# Patient Record
Sex: FEMALE | Race: WHITE | Employment: OTHER | URBAN - METROPOLITAN AREA
[De-identification: names, ages, dates, MRNs, and addresses within clinical notes are randomized per-mention and may not be internally consistent; named-entity substitution may affect disease eponyms.]

---

## 2017-05-30 RX ORDER — LEVOTHYROXINE SODIUM 88 UG/1
TABLET ORAL
Qty: 30 TABLET | Refills: 0 | Status: SHIPPED | OUTPATIENT
Start: 2017-05-30 | End: 2017-06-08

## 2017-05-30 NOTE — TELEPHONE ENCOUNTER
Future Appointments  Date Time Provider Zeke Mallory   6/2/2017 9:30 AM Tiffany Cr,  [facility] [address] [facility] [address]

## 2017-05-30 NOTE — TELEPHONE ENCOUNTER
Please call pt and schedule an appt for medication refills. Pt hasnt been seen since 2015. Does she have new PCP? Levothyroxine placed in pending until appt is made and PCP clarified. No future appointments.

## 2017-06-08 ENCOUNTER — OFFICE VISIT (OUTPATIENT)
Dept: FAMILY MEDICINE CLINIC | Facility: CLINIC | Age: 67
End: 2017-06-08

## 2017-06-08 VITALS
BODY MASS INDEX: 30.18 KG/M2 | HEART RATE: 82 BPM | TEMPERATURE: 98 F | OXYGEN SATURATION: 99 % | WEIGHT: 164 LBS | RESPIRATION RATE: 16 BRPM | SYSTOLIC BLOOD PRESSURE: 118 MMHG | HEIGHT: 62 IN | DIASTOLIC BLOOD PRESSURE: 76 MMHG

## 2017-06-08 DIAGNOSIS — Z76.0 MEDICATION REFILL: ICD-10-CM

## 2017-06-08 DIAGNOSIS — N60.12 FIBROCYSTIC BREAST CHANGES OF BOTH BREASTS: ICD-10-CM

## 2017-06-08 DIAGNOSIS — Z13.31 DEPRESSION SCREEN: ICD-10-CM

## 2017-06-08 DIAGNOSIS — Z12.31 VISIT FOR SCREENING MAMMOGRAM: ICD-10-CM

## 2017-06-08 DIAGNOSIS — Z00.00 LABORATORY EXAMINATION ORDERED AS PART OF A ROUTINE GENERAL MEDICAL EXAMINATION: ICD-10-CM

## 2017-06-08 DIAGNOSIS — E03.9 ACQUIRED HYPOTHYROIDISM: Primary | ICD-10-CM

## 2017-06-08 DIAGNOSIS — N60.11 FIBROCYSTIC BREAST CHANGES OF BOTH BREASTS: ICD-10-CM

## 2017-06-08 PROCEDURE — 99213 OFFICE O/P EST LOW 20 MIN: CPT | Performed by: FAMILY MEDICINE

## 2017-06-08 RX ORDER — LEVOTHYROXINE SODIUM 88 UG/1
TABLET ORAL
Qty: 90 TABLET | Refills: 1 | Status: SHIPPED | OUTPATIENT
Start: 2017-06-08 | End: 2017-12-21

## 2017-06-08 NOTE — PROGRESS NOTES
Tom Ruiz is a 78year old female. HPI:   Pt here to re-est care and for hypothyroidism management and not seen since 2015. Courtesy refills given and pt has been doing labs and thyroid levels are controlled on Levothyroxine 88mcg daily.  Turned 6 (36.7 °C) (Temporal)  Resp 16  Ht 62\"  Wt 164 lb  BMI 29.99 kg/m2  SpO2 99%  GENERAL: well developed, well nourished,in no apparent distress  SKIN: no rashes,no suspicious lesions  HEENT: atraumatic, normocephalic,ears and throat are clear  NECK: supple,n

## 2017-06-08 NOTE — PATIENT INSTRUCTIONS
Kishor you were seen for hypothyroidism management. Get your mammogram done. Return for an annual well exam. Get labs done on the day of the annual well exam.   Take care, Dr. Arti Acevedo    Common Thyroid Problems    The thyroid is a gland in the neck. Nodules are lumps of tissue in the thyroid gland. Most often, the cause of nodules isn’t known. But they may be more common in people who’ve had medical radiation to the head or neck. Sometimes nodules can be felt on the outside of the neck.  Most of the ti

## 2017-07-21 LAB
ABSOLUTE BASOPHILS: 28 CELLS/UL (ref 0–200)
ABSOLUTE EOSINOPHILS: 160 CELLS/UL (ref 15–500)
ABSOLUTE LYMPHOCYTES: 1537 CELLS/UL (ref 850–3900)
ABSOLUTE MONOCYTES: 508 CELLS/UL (ref 200–950)
ABSOLUTE NEUTROPHILS: 2468 CELLS/UL (ref 1500–7800)
ALBUMIN/GLOBULIN RATIO: 1.7 (CALC) (ref 1–2.5)
ALBUMIN: 4.1 G/DL (ref 3.6–5.1)
ALKALINE PHOSPHATASE: 66 U/L (ref 33–130)
ALT: 16 U/L (ref 6–29)
AST: 16 U/L (ref 10–35)
BASOPHILS: 0.6 %
BILIRUBIN, TOTAL: 0.4 MG/DL (ref 0.2–1.2)
BUN: 17 MG/DL (ref 7–25)
CALCIUM: 9.4 MG/DL (ref 8.6–10.4)
CARBON DIOXIDE: 27 MMOL/L (ref 20–31)
CHLORIDE: 107 MMOL/L (ref 98–110)
CHOL/HDLC RATIO: 4 (CALC)
CHOLESTEROL, TOTAL: 265 MG/DL (ref 125–200)
CREATININE: 0.8 MG/DL (ref 0.5–0.99)
EGFR IF AFRICN AM: 89 ML/MIN/1.73M2
EGFR IF NONAFRICN AM: 77 ML/MIN/1.73M2
EOSINOPHILS: 3.4 %
GLOBULIN: 2.4 G/DL (CALC) (ref 1.9–3.7)
GLUCOSE: 99 MG/DL (ref 65–99)
HDL CHOLESTEROL: 66 MG/DL
HEMATOCRIT: 38.2 % (ref 35–45)
HEMOGLOBIN: 13.5 G/DL (ref 11.7–15.5)
LDL-CHOLESTEROL: 167 MG/DL (CALC)
LYMPHOCYTES: 32.7 %
MCH: 31.2 PG (ref 27–33)
MCHC: 35.3 G/DL (ref 32–36)
MCV: 88.2 FL (ref 80–100)
MONOCYTES: 10.8 %
MPV: 10 FL (ref 7.5–12.5)
NEUTROPHILS: 52.5 %
NON-HDL CHOLESTEROL: 199 MG/DL (CALC)
PLATELET COUNT: 254 THOUSAND/UL (ref 140–400)
POTASSIUM: 4.4 MMOL/L (ref 3.5–5.3)
PROTEIN, TOTAL: 6.5 G/DL (ref 6.1–8.1)
RDW: 11.8 % (ref 11–15)
RED BLOOD CELL COUNT: 4.33 MILLION/UL (ref 3.8–5.1)
SODIUM: 139 MMOL/L (ref 135–146)
TRIGLYCERIDES: 158 MG/DL
TSH W/REFLEX TO FT4: 0.47 MIU/L (ref 0.4–4.5)
WHITE BLOOD CELL COUNT: 4.7 THOUSAND/UL (ref 3.8–10.8)

## 2017-07-21 NOTE — PROGRESS NOTES
Please call pt with normal results of her cmp, cbc, and thyroid labs. Advise her lipid panel is elevated with total of 265 and LDL of 167 (goal of 99) and HDL of only 66.  She will need to consider statin medication and an OV to discuss medication and treat

## 2017-08-08 ENCOUNTER — TELEPHONE (OUTPATIENT)
Dept: FAMILY MEDICINE CLINIC | Facility: CLINIC | Age: 67
End: 2017-08-08

## 2017-08-08 NOTE — TELEPHONE ENCOUNTER
Patient stated she is returning our call - states probably for test results.   Please call her at [phone]

## 2017-08-12 ENCOUNTER — PATIENT OUTREACH (OUTPATIENT)
Dept: FAMILY MEDICINE CLINIC | Facility: CLINIC | Age: 67
End: 2017-08-12

## 2017-08-12 NOTE — PROGRESS NOTES
Future Appointments  Date Time Provider Zeke Mallory   12/18/2017 1:00 PM Benny Cr,  [facility] [address] [facility] [address]

## 2017-12-05 ENCOUNTER — HOSPITAL ENCOUNTER (OUTPATIENT)
Dept: MAMMOGRAPHY | Age: 67
Discharge: HOME | End: 2017-12-05
Attending: FAMILY MEDICINE
Payer: MEDICARE

## 2017-12-05 DIAGNOSIS — N60.11 FIBROCYSTIC BREAST CHANGES OF BOTH BREASTS: ICD-10-CM

## 2017-12-05 DIAGNOSIS — N60.12 FIBROCYSTIC BREAST CHANGES OF BOTH BREASTS: ICD-10-CM

## 2017-12-05 DIAGNOSIS — Z12.31 VISIT FOR SCREENING MAMMOGRAM: ICD-10-CM

## 2017-12-05 PROCEDURE — 77063 BREAST TOMOSYNTHESIS BI: CPT | Performed by: FAMILY MEDICINE

## 2017-12-05 PROCEDURE — 77067 SCR MAMMO BI INCL CAD: CPT | Performed by: FAMILY MEDICINE

## 2017-12-21 ENCOUNTER — OFFICE VISIT (OUTPATIENT)
Dept: FAMILY MEDICINE CLINIC | Facility: CLINIC | Age: 67
End: 2017-12-21

## 2017-12-21 VITALS
RESPIRATION RATE: 16 BRPM | OXYGEN SATURATION: 99 % | WEIGHT: 168 LBS | BODY MASS INDEX: 30.91 KG/M2 | TEMPERATURE: 98 F | DIASTOLIC BLOOD PRESSURE: 76 MMHG | HEART RATE: 76 BPM | SYSTOLIC BLOOD PRESSURE: 118 MMHG | HEIGHT: 62 IN

## 2017-12-21 DIAGNOSIS — Z13.31 DEPRESSION SCREEN: ICD-10-CM

## 2017-12-21 DIAGNOSIS — Z71.82 EXERCISE COUNSELING: ICD-10-CM

## 2017-12-21 DIAGNOSIS — Z76.0 MEDICATION REFILL: ICD-10-CM

## 2017-12-21 DIAGNOSIS — Z23 NEED FOR PNEUMOCOCCAL VACCINATION: ICD-10-CM

## 2017-12-21 DIAGNOSIS — Z00.00 ENCOUNTER FOR MEDICARE ANNUAL WELLNESS EXAM: Primary | ICD-10-CM

## 2017-12-21 DIAGNOSIS — Z71.3 DIETARY COUNSELING: ICD-10-CM

## 2017-12-21 DIAGNOSIS — E03.9 ACQUIRED HYPOTHYROIDISM: ICD-10-CM

## 2017-12-21 DIAGNOSIS — R92.2 DENSE BREAST TISSUE ON MAMMOGRAM: ICD-10-CM

## 2017-12-21 PROBLEM — R92.30 DENSE BREAST TISSUE ON MAMMOGRAM: Status: ACTIVE | Noted: 2017-12-21

## 2017-12-21 PROCEDURE — G0438 PPPS, INITIAL VISIT: HCPCS | Performed by: FAMILY MEDICINE

## 2017-12-21 PROCEDURE — 96160 PT-FOCUSED HLTH RISK ASSMT: CPT | Performed by: FAMILY MEDICINE

## 2017-12-21 RX ORDER — AMOXICILLIN 500 MG/1
CAPSULE ORAL
Refills: 0 | COMMUNITY
Start: 2017-10-12 | End: 2018-07-16 | Stop reason: ALTCHOICE

## 2017-12-21 RX ORDER — HYDROCODONE BITARTRATE AND IBUPROFEN 7.5; 2 MG/1; MG/1
TABLET, FILM COATED ORAL
Refills: 0 | COMMUNITY
Start: 2017-10-12 | End: 2018-07-16 | Stop reason: ALTCHOICE

## 2017-12-21 RX ORDER — LEVOTHYROXINE SODIUM 88 UG/1
TABLET ORAL
Qty: 90 TABLET | Refills: 1 | Status: SHIPPED | OUTPATIENT
Start: 2017-12-21 | End: 2018-06-25

## 2017-12-21 NOTE — PROGRESS NOTES
HPI:   Jeanine Lopez is a 78year old female who presents for a MA (Medicare Advantage) Supervisit (Once per calendar year). Pt feeling well today.  Dentist and eye doctors--seen dentist and work done; no eye check this year;   Diet --healthy    Exer She has never smoked tobacco.    CAGE Alcohol screening   Haritha Menchaca was screened for Alcohol abuse and had a score of 0 so is at low risk.     Patient Care Team: Patient Care Team:  Clifton Peterson DO as PCP - General      Wt Readings from [place] has never smoked. She has never used smokeless tobacco. She reports that she drinks alcohol. She reports that she does not use drugs.      REVIEW OF SYSTEMS:   GENERAL: feels well otherwise for her annual Medicare Advantage exam; see HPI notes above for fur respirations unlabored   Heart:  Regular rate and rhythm, S1 and S2 normal, no murmur, rub, or gallop   Abdomen:   Soft, non-tender, bowel sounds active all four quadrants,  no masses, no organomegaly   Pelvic: Deferred   Extremities: Extremities normal, a maintain a good energy level?: Appropriate Exercise;Daily Walks  How would you describe your daily physical activity?: Moderate  How would you describe your current health state?: Good  How do you maintain positive mental well-being?: Social Interaction;Vi and pt will wait she states today  Please get once after your 65th birthday    Hepatitis B for Moderate/High Risk No vaccine history found Medium/high risk factors:   End-stage renal disease   Hemophiliacs who received Factor VIII or IX concentrates   Carmen

## 2017-12-21 NOTE — PATIENT INSTRUCTIONS
Kishor Zaman's SCREENING SCHEDULE   Tests on this list are recommended by your physician but may not be covered, or covered at this frequency, by your insurer. Please check with your insurance carrier before scheduling to verify coverage.    VANESA (Steroids) Requires diagnosis related to osteoporosis or estrogen deficiency.    Biennial benefit unless patient has history of long-term glucocorticoid use for medical condition    In future    Pap and Pelvic     Pap: Every 3 yrs age 21-65 or Pap+HPV every

## 2018-06-25 DIAGNOSIS — Z76.0 MEDICATION REFILL: ICD-10-CM

## 2018-06-25 DIAGNOSIS — E03.9 ACQUIRED HYPOTHYROIDISM: ICD-10-CM

## 2018-06-25 NOTE — TELEPHONE ENCOUNTER
Cannot escribe due to provider on script    Levothyroxine . [facility]     stephanie in [place]

## 2018-06-26 RX ORDER — LEVOTHYROXINE SODIUM 88 UG/1
TABLET ORAL
Qty: 90 TABLET | Refills: 0 | Status: SHIPPED | OUTPATIENT
Start: 2018-06-26 | End: 2018-07-16

## 2018-06-26 NOTE — TELEPHONE ENCOUNTER
Future Appointments  Date Time Provider Zeke Mallory   7/16/2018 11:30 AM Denver Finley MD [facility] [address] [facility] [address]

## 2018-07-16 ENCOUNTER — APPOINTMENT (OUTPATIENT)
Dept: LAB | Age: 68
End: 2018-07-16
Attending: FAMILY MEDICINE
Payer: MEDICARE

## 2018-07-16 ENCOUNTER — OFFICE VISIT (OUTPATIENT)
Dept: FAMILY MEDICINE CLINIC | Facility: CLINIC | Age: 68
End: 2018-07-16

## 2018-07-16 VITALS
HEART RATE: 68 BPM | DIASTOLIC BLOOD PRESSURE: 74 MMHG | HEIGHT: 62 IN | WEIGHT: 155 LBS | TEMPERATURE: 98 F | SYSTOLIC BLOOD PRESSURE: 118 MMHG | RESPIRATION RATE: 16 BRPM | BODY MASS INDEX: 28.52 KG/M2

## 2018-07-16 DIAGNOSIS — E78.2 MIXED HYPERLIPIDEMIA: ICD-10-CM

## 2018-07-16 DIAGNOSIS — E03.9 ACQUIRED HYPOTHYROIDISM: ICD-10-CM

## 2018-07-16 DIAGNOSIS — E03.9 ACQUIRED HYPOTHYROIDISM: Primary | ICD-10-CM

## 2018-07-16 DIAGNOSIS — Z76.0 MEDICATION REFILL: ICD-10-CM

## 2018-07-16 PROBLEM — I83.93 ASYMPTOMATIC VARICOSE VEINS OF BOTH LOWER EXTREMITIES: Status: ACTIVE | Noted: 2018-07-16

## 2018-07-16 LAB
ALBUMIN SERPL-MCNC: 3.9 G/DL (ref 3.5–4.8)
ALP LIVER SERPL-CCNC: 70 U/L (ref 55–142)
ALT SERPL-CCNC: 25 U/L (ref 14–54)
AST SERPL-CCNC: 17 U/L (ref 15–41)
BILIRUB SERPL-MCNC: 0.4 MG/DL (ref 0.1–2)
BUN BLD-MCNC: 24 MG/DL (ref 8–20)
CALCIUM BLD-MCNC: 9.4 MG/DL (ref 8.3–10.3)
CHLORIDE: 105 MMOL/L (ref 101–111)
CHOLEST SMN-MCNC: 287 MG/DL (ref ?–200)
CO2: 27 MMOL/L (ref 22–32)
CREAT BLD-MCNC: 0.73 MG/DL (ref 0.55–1.02)
GLUCOSE BLD-MCNC: 86 MG/DL (ref 70–99)
HDLC SERPL-MCNC: 83 MG/DL (ref 45–?)
HDLC SERPL: 3.46 {RATIO} (ref ?–4.44)
LDLC SERPL CALC-MCNC: 185 MG/DL (ref ?–130)
M PROTEIN MFR SERPL ELPH: 7.2 G/DL (ref 6.1–8.3)
NONHDLC SERPL-MCNC: 204 MG/DL (ref ?–130)
POTASSIUM SERPL-SCNC: 4.4 MMOL/L (ref 3.6–5.1)
SODIUM SERPL-SCNC: 139 MMOL/L (ref 136–144)
TRIGL SERPL-MCNC: 94 MG/DL (ref ?–150)
TSI SER-ACNC: 0.41 MIU/ML (ref 0.35–5.5)
VLDLC SERPL CALC-MCNC: 19 MG/DL (ref 5–40)

## 2018-07-16 PROCEDURE — 84443 ASSAY THYROID STIM HORMONE: CPT

## 2018-07-16 PROCEDURE — 80053 COMPREHEN METABOLIC PANEL: CPT

## 2018-07-16 PROCEDURE — 99213 OFFICE O/P EST LOW 20 MIN: CPT | Performed by: FAMILY MEDICINE

## 2018-07-16 PROCEDURE — 80061 LIPID PANEL: CPT

## 2018-07-16 PROCEDURE — 36415 COLL VENOUS BLD VENIPUNCTURE: CPT

## 2018-07-16 RX ORDER — LEVOTHYROXINE SODIUM 88 UG/1
88 TABLET ORAL
Qty: 90 TABLET | Refills: 3 | Status: SHIPPED | OUTPATIENT
Start: 2018-07-16 | End: 2019-09-12

## 2018-07-16 NOTE — PROGRESS NOTES
Patient presents with:  Establish Care: med refills       HPI:  Robert Weathers is a 80year old female here today for the above. Acquired hypothyroidism–patient is currently on 88 mcg of levothyroxine is due for labs to recheck.       Mixed hyperlipide 97.6 °F (36.4 °C) (Temporal)   Resp 16   Ht 62\"   Wt 155 lb   BMI 28.35 kg/m²   GENERAL APPEARANCE:  Alert, no acute distress, appears stated age  HEENT:  NCAT, EOMI, mucus membranes moist   NECK:  No submental, submandibular, ant/post cervical lymphadeno

## 2019-09-12 ENCOUNTER — OFFICE VISIT (OUTPATIENT)
Dept: FAMILY MEDICINE CLINIC | Facility: CLINIC | Age: 69
End: 2019-09-12
Payer: MEDICARE

## 2019-09-12 ENCOUNTER — LAB ENCOUNTER (OUTPATIENT)
Dept: LAB | Age: 69
End: 2019-09-12
Attending: FAMILY MEDICINE
Payer: MEDICARE

## 2019-09-12 VITALS
HEIGHT: 62 IN | BODY MASS INDEX: 28.89 KG/M2 | TEMPERATURE: 97 F | HEART RATE: 76 BPM | RESPIRATION RATE: 16 BRPM | DIASTOLIC BLOOD PRESSURE: 82 MMHG | WEIGHT: 157 LBS | OXYGEN SATURATION: 97 % | SYSTOLIC BLOOD PRESSURE: 156 MMHG

## 2019-09-12 DIAGNOSIS — Z12.39 SCREENING FOR BREAST CANCER: ICD-10-CM

## 2019-09-12 DIAGNOSIS — Z12.11 SCREENING FOR COLON CANCER: ICD-10-CM

## 2019-09-12 DIAGNOSIS — E03.9 ACQUIRED HYPOTHYROIDISM: ICD-10-CM

## 2019-09-12 DIAGNOSIS — R92.2 DENSE BREAST TISSUE ON MAMMOGRAM: ICD-10-CM

## 2019-09-12 DIAGNOSIS — Z76.0 MEDICATION REFILL: ICD-10-CM

## 2019-09-12 DIAGNOSIS — Z00.00 LABORATORY EXAM ORDERED AS PART OF ROUTINE GENERAL MEDICAL EXAMINATION: ICD-10-CM

## 2019-09-12 DIAGNOSIS — I83.93 ASYMPTOMATIC VARICOSE VEINS OF BOTH LOWER EXTREMITIES: ICD-10-CM

## 2019-09-12 DIAGNOSIS — R03.0 ELEVATED BLOOD PRESSURE READING: ICD-10-CM

## 2019-09-12 DIAGNOSIS — Z00.00 ENCOUNTER FOR ANNUAL HEALTH EXAMINATION: ICD-10-CM

## 2019-09-12 DIAGNOSIS — E28.39 ESTROGEN DEFICIENCY: ICD-10-CM

## 2019-09-12 DIAGNOSIS — E78.2 MIXED HYPERLIPIDEMIA: ICD-10-CM

## 2019-09-12 DIAGNOSIS — Z00.00 WELLNESS EXAMINATION: Primary | ICD-10-CM

## 2019-09-12 LAB
ALBUMIN SERPL-MCNC: 4 G/DL (ref 3.4–5)
ALBUMIN/GLOB SERPL: 1.3 {RATIO} (ref 1–2)
ALP LIVER SERPL-CCNC: 62 U/L (ref 55–142)
ALT SERPL-CCNC: 23 U/L (ref 13–56)
ANION GAP SERPL CALC-SCNC: 5 MMOL/L (ref 0–18)
AST SERPL-CCNC: 13 U/L (ref 15–37)
BASOPHILS # BLD AUTO: 0.03 X10(3) UL (ref 0–0.2)
BASOPHILS NFR BLD AUTO: 0.6 %
BILIRUB SERPL-MCNC: 0.4 MG/DL (ref 0.1–2)
BUN BLD-MCNC: 13 MG/DL (ref 7–18)
BUN/CREAT SERPL: 17.3 (ref 10–20)
CALCIUM BLD-MCNC: 9.3 MG/DL (ref 8.5–10.1)
CHLORIDE SERPL-SCNC: 106 MMOL/L (ref 98–112)
CHOLEST SMN-MCNC: 278 MG/DL (ref ?–200)
CO2 SERPL-SCNC: 28 MMOL/L (ref 21–32)
CREAT BLD-MCNC: 0.75 MG/DL (ref 0.55–1.02)
DEPRECATED RDW RBC AUTO: 41.3 FL (ref 35.1–46.3)
EOSINOPHIL # BLD AUTO: 0.11 X10(3) UL (ref 0–0.7)
EOSINOPHIL NFR BLD AUTO: 2.2 %
ERYTHROCYTE [DISTWIDTH] IN BLOOD BY AUTOMATED COUNT: 12.2 % (ref 11–15)
GLOBULIN PLAS-MCNC: 3 G/DL (ref 2.8–4.4)
GLUCOSE BLD-MCNC: 91 MG/DL (ref 70–99)
HCT VFR BLD AUTO: 40.1 % (ref 35–48)
HDLC SERPL-MCNC: 69 MG/DL (ref 40–59)
HGB BLD-MCNC: 13.4 G/DL (ref 12–16)
IMM GRANULOCYTES # BLD AUTO: 0.01 X10(3) UL (ref 0–1)
IMM GRANULOCYTES NFR BLD: 0.2 %
LDLC SERPL CALC-MCNC: 175 MG/DL (ref ?–100)
LYMPHOCYTES # BLD AUTO: 1.5 X10(3) UL (ref 1–4)
LYMPHOCYTES NFR BLD AUTO: 30.6 %
M PROTEIN MFR SERPL ELPH: 7 G/DL (ref 6.4–8.2)
MCH RBC QN AUTO: 31 PG (ref 26–34)
MCHC RBC AUTO-ENTMCNC: 33.4 G/DL (ref 31–37)
MCV RBC AUTO: 92.8 FL (ref 80–100)
MONOCYTES # BLD AUTO: 0.53 X10(3) UL (ref 0.1–1)
MONOCYTES NFR BLD AUTO: 10.8 %
NEUTROPHILS # BLD AUTO: 2.72 X10 (3) UL (ref 1.5–7.7)
NEUTROPHILS # BLD AUTO: 2.72 X10(3) UL (ref 1.5–7.7)
NEUTROPHILS NFR BLD AUTO: 55.6 %
NONHDLC SERPL-MCNC: 209 MG/DL (ref ?–130)
OSMOLALITY SERPL CALC.SUM OF ELEC: 288 MOSM/KG (ref 275–295)
PLATELET # BLD AUTO: 242 10(3)UL (ref 150–450)
POTASSIUM SERPL-SCNC: 3.9 MMOL/L (ref 3.5–5.1)
RBC # BLD AUTO: 4.32 X10(6)UL (ref 3.8–5.3)
SODIUM SERPL-SCNC: 139 MMOL/L (ref 136–145)
T4 FREE SERPL-MCNC: 1.2 NG/DL (ref 0.8–1.7)
TRIGL SERPL-MCNC: 170 MG/DL (ref 30–149)
TSI SER-ACNC: 0.21 MIU/ML (ref 0.36–3.74)
VLDLC SERPL CALC-MCNC: 34 MG/DL (ref 0–30)
WBC # BLD AUTO: 4.9 X10(3) UL (ref 4–11)

## 2019-09-12 PROCEDURE — 84443 ASSAY THYROID STIM HORMONE: CPT

## 2019-09-12 PROCEDURE — G0439 PPPS, SUBSEQ VISIT: HCPCS | Performed by: PHYSICIAN ASSISTANT

## 2019-09-12 PROCEDURE — 85025 COMPLETE CBC W/AUTO DIFF WBC: CPT

## 2019-09-12 PROCEDURE — 36415 COLL VENOUS BLD VENIPUNCTURE: CPT

## 2019-09-12 PROCEDURE — 80061 LIPID PANEL: CPT

## 2019-09-12 PROCEDURE — 99397 PER PM REEVAL EST PAT 65+ YR: CPT | Performed by: PHYSICIAN ASSISTANT

## 2019-09-12 PROCEDURE — 80053 COMPREHEN METABOLIC PANEL: CPT

## 2019-09-12 PROCEDURE — 96160 PT-FOCUSED HLTH RISK ASSMT: CPT | Performed by: PHYSICIAN ASSISTANT

## 2019-09-12 PROCEDURE — 84439 ASSAY OF FREE THYROXINE: CPT

## 2019-09-12 RX ORDER — LEVOTHYROXINE SODIUM 88 UG/1
88 TABLET ORAL
Qty: 90 TABLET | Refills: 3 | Status: SHIPPED | OUTPATIENT
Start: 2019-09-12 | End: 2020-09-11

## 2019-09-12 NOTE — PROGRESS NOTES
REASON FOR VISIT:    Estela Michaud is a 77year old female who presents for a MA Supervisit.     Dr. Lenny Jimenez group for colonoscopy       Pt declines influenza and pneumonia vaccine    Patient Care Team: Patient Care Team:  Helder Scott MD as PCP patient maintain a good energy level?: Daily Walks  How would you describe your daily physical activity?: Moderate  How would you describe your current health state?: Good  How do you maintain positive mental well-being?: Visiting Friends; Visiting Family it?: Correct  What year is it?: Correct  Recall \"Ball\": Correct  Recall \"Flag\": Correct  Recall \"Tree\": Correct         PREVENTATIVE SERVICES   INDICATIONS AND SCHEDULE Internal Lab or Procedure   Diabetes Screening     HbgA1C   Annually No results f Use      Smoking status: Never Smoker      Smokeless tobacco: Never Used    Alcohol use: Yes      Comment: wine occ.     Drug use: No       REVIEW OF SYSTEMS:   GENERAL: feels well otherwise  SKIN: denies any unusual skin lesions  EYES: denies blurred visio status    Screening for breast cancer  -     [facility] BIRDIE 2D+3D SCREENING BILAT (CPT=77067/54366); Future    Laboratory exam ordered as part of routine general medical examination  -     CBC WITH DIFFERENTIAL WITH PLATELET;  Future  -     COMP METABOLIC PANEL ( Pneumococcal, Zoster, Tetanus   Influenza: Influenza Vaccine(1) due on 09/01/2019  Pneumonia: Pneumococcal vaccination(1 of 2 - PCV13) due on 04/01/2016  Shingrix shingles vaccine is due

## 2019-09-12 NOTE — PATIENT INSTRUCTIONS
Thank you for choosing William Garrido PA-C at [place] [address]  To Do: Kishor Zaman  1. Patient to get lab work done  2. Patient to continue medications as prescribed  3. Monitor blood pressure at home, keep journal  4.  Follow-up in 1 month fo company. Once our office has called informing you that the insurance company approved your testing, please call Central Scheduling at [phone]  Please allow our office 5 business days to contact you regarding any testing results.     Refill policies Trigs LDL Cholesterol (mg/dL)   Date Value   07/16/2018 185 (H)     LDL-CHOLESTEROL (mg/dL (calc))   Date Value   07/20/2017 167 (H)     Cholesterol, Total (mg/dL)   Date Value   07/16/2018 287 (H)     CHOLESTEROL, TOTAL (mg/dL)   Date Value   07/20/2017 2 history of long-term glucocorticoid use for medical condition    No results found for this or any previous visit. No flowsheet data found.     Recommended for 2 year anniversary or as ordered in After Visit Summary   Pap and Pelvic      Pap: Every 3 yrs age Directives    [employer]. org/publications/Documents/personal_dec. pdf  An information packet, including necessary form from the [employer] website. http://www. [place].state. [place]/public/books/advin.htm  A link to the [place]

## 2019-09-16 NOTE — PROGRESS NOTES
Cholesterol is elevated, I would recommend starting rosuvastatin 20mg nightly along with OTC CoQ10 300mg   TSH is leaning towards hyperthyroidism, is pt having any symptoms, if not will continue current dosage and continue to monitor  Other labs are stable

## 2020-09-11 DIAGNOSIS — E03.9 ACQUIRED HYPOTHYROIDISM: ICD-10-CM

## 2020-09-11 DIAGNOSIS — Z00.00 LABORATORY EXAM ORDERED AS PART OF ROUTINE GENERAL MEDICAL EXAMINATION: ICD-10-CM

## 2020-09-11 DIAGNOSIS — E78.2 MIXED HYPERLIPIDEMIA: Primary | ICD-10-CM

## 2020-09-11 DIAGNOSIS — Z76.0 MEDICATION REFILL: ICD-10-CM

## 2020-09-11 NOTE — TELEPHONE ENCOUNTER
Requested Prescriptions     Pending Prescriptions Disp Refills   • LEVOTHYROXINE SODIUM 88 MCG Oral Tab [Pharmacy Med Name: LEVOTHYROXINE 0.088MG (88MCG) TAB] 90 tablet 3     Sig: TAKE 1 TABLET(88 MCG) BY MOUTH EVERY DAY BEFORE BREAKFAST       LOV: 9/12/20

## 2020-09-12 RX ORDER — LEVOTHYROXINE SODIUM 88 UG/1
88 TABLET ORAL
Qty: 30 TABLET | Refills: 0 | Status: SHIPPED | OUTPATIENT
Start: 2020-09-12 | End: 2020-10-13

## 2020-09-12 NOTE — TELEPHONE ENCOUNTER
Please call pt and recommend an appt for physical and get fasting labs done. I placed orders and ready when she is. I did a 30d refill of her meds.

## 2020-09-15 NOTE — TELEPHONE ENCOUNTER
1stt outreach to schedule physical.  Fasting labs placed will need to contact central scheduling to set appt for labs.   LM/ts

## 2020-09-29 ENCOUNTER — TELEPHONE (OUTPATIENT)
Dept: FAMILY MEDICINE CLINIC | Facility: CLINIC | Age: 70
End: 2020-09-29

## 2020-09-29 NOTE — TELEPHONE ENCOUNTER
Recd request and authorization from [employer] for medical records from 1-1-2019 to 12-. Fax to [phone] when complete. Faxed to [employer] for processing.

## 2020-10-13 DIAGNOSIS — Z76.0 MEDICATION REFILL: ICD-10-CM

## 2020-10-13 DIAGNOSIS — E03.9 ACQUIRED HYPOTHYROIDISM: ICD-10-CM

## 2020-10-13 RX ORDER — LEVOTHYROXINE SODIUM 88 UG/1
TABLET ORAL
Qty: 30 TABLET | Refills: 0 | Status: SHIPPED | OUTPATIENT
Start: 2020-10-13 | End: 2020-11-13

## 2020-10-13 RX ORDER — LEVOTHYROXINE SODIUM 88 UG/1
TABLET ORAL
Qty: 90 TABLET | Refills: 0 | OUTPATIENT
Start: 2020-10-13

## 2020-10-13 NOTE — TELEPHONE ENCOUNTER
Name from pharmacy: LEVOTHYROXINE 0.088MG (88MCG) TAB          Will file in chart as: LEVOTHYROXINE SODIUM 88 MCG Oral Tab    Sig: TAKE 1 TABLET(88 MCG) BY MOUTH EVERY MORNING BEFORE BREAKFAST    Disp:  30 tablet    Refills:  0 (Pharmacy requested: Not spe

## 2020-11-11 DIAGNOSIS — Z76.0 MEDICATION REFILL: ICD-10-CM

## 2020-11-11 DIAGNOSIS — E03.9 ACQUIRED HYPOTHYROIDISM: ICD-10-CM

## 2020-11-11 NOTE — TELEPHONE ENCOUNTER
Future Appointments   Date Time Provider Zeke Shawnee   11/16/2020  1:00 PM Adán Salinas MD [facility] [address] [facility] [address]     Patient states she will be out of her medication before her appt, please advise,

## 2020-11-12 RX ORDER — LEVOTHYROXINE SODIUM 88 UG/1
TABLET ORAL
Qty: 30 TABLET | Refills: 0 | OUTPATIENT
Start: 2020-11-12

## 2020-11-13 DIAGNOSIS — E03.9 ACQUIRED HYPOTHYROIDISM: ICD-10-CM

## 2020-11-13 DIAGNOSIS — Z76.0 MEDICATION REFILL: ICD-10-CM

## 2020-11-13 RX ORDER — LEVOTHYROXINE SODIUM 88 UG/1
88 TABLET ORAL
Qty: 30 TABLET | Refills: 0 | Status: SHIPPED | OUTPATIENT
Start: 2020-11-13 | End: 2020-12-09

## 2020-11-14 NOTE — TELEPHONE ENCOUNTER
Requested Prescriptions     Name from pharmacy: LEVOTHYROXINE 0.088MG (88MCG) TAB         Will file in chart as: LEVOTHYROXINE SODIUM 88 MCG Oral Tab    The original prescription was reordered on 11/13/2020 by Napoleon Goodson MD. Renewing this prescri

## 2020-11-16 ENCOUNTER — OFFICE VISIT (OUTPATIENT)
Dept: FAMILY MEDICINE CLINIC | Facility: CLINIC | Age: 70
End: 2020-11-16
Payer: MEDICARE

## 2020-11-16 ENCOUNTER — LAB ENCOUNTER (OUTPATIENT)
Dept: LAB | Age: 70
End: 2020-11-16
Attending: FAMILY MEDICINE
Payer: MEDICARE

## 2020-11-16 VITALS
BODY MASS INDEX: 27.23 KG/M2 | HEART RATE: 80 BPM | DIASTOLIC BLOOD PRESSURE: 72 MMHG | RESPIRATION RATE: 16 BRPM | TEMPERATURE: 98 F | OXYGEN SATURATION: 98 % | SYSTOLIC BLOOD PRESSURE: 126 MMHG | HEIGHT: 62 IN | WEIGHT: 148 LBS

## 2020-11-16 DIAGNOSIS — E03.9 ACQUIRED HYPOTHYROIDISM: ICD-10-CM

## 2020-11-16 DIAGNOSIS — Z12.31 ENCOUNTER FOR SCREENING MAMMOGRAM FOR MALIGNANT NEOPLASM OF BREAST: ICD-10-CM

## 2020-11-16 DIAGNOSIS — E78.2 MIXED HYPERLIPIDEMIA: ICD-10-CM

## 2020-11-16 DIAGNOSIS — Z11.59 ENCOUNTER FOR HEPATITIS C SCREENING TEST FOR LOW RISK PATIENT: ICD-10-CM

## 2020-11-16 DIAGNOSIS — Z12.11 COLON CANCER SCREENING: ICD-10-CM

## 2020-11-16 DIAGNOSIS — Z78.0 ASYMPTOMATIC MENOPAUSAL STATE: ICD-10-CM

## 2020-11-16 DIAGNOSIS — Z00.00 ENCOUNTER FOR ANNUAL HEALTH EXAMINATION: Primary | ICD-10-CM

## 2020-11-16 DIAGNOSIS — Z00.00 LABORATORY EXAM ORDERED AS PART OF ROUTINE GENERAL MEDICAL EXAMINATION: ICD-10-CM

## 2020-11-16 PROCEDURE — 80061 LIPID PANEL: CPT

## 2020-11-16 PROCEDURE — 99397 PER PM REEVAL EST PAT 65+ YR: CPT | Performed by: FAMILY MEDICINE

## 2020-11-16 PROCEDURE — 36415 COLL VENOUS BLD VENIPUNCTURE: CPT

## 2020-11-16 PROCEDURE — 80053 COMPREHEN METABOLIC PANEL: CPT

## 2020-11-16 PROCEDURE — 84439 ASSAY OF FREE THYROXINE: CPT

## 2020-11-16 PROCEDURE — 86803 HEPATITIS C AB TEST: CPT

## 2020-11-16 PROCEDURE — 3074F SYST BP LT 130 MM HG: CPT | Performed by: FAMILY MEDICINE

## 2020-11-16 PROCEDURE — 85025 COMPLETE CBC W/AUTO DIFF WBC: CPT

## 2020-11-16 PROCEDURE — 3078F DIAST BP <80 MM HG: CPT | Performed by: FAMILY MEDICINE

## 2020-11-16 PROCEDURE — 84443 ASSAY THYROID STIM HORMONE: CPT

## 2020-11-16 PROCEDURE — 96160 PT-FOCUSED HLTH RISK ASSMT: CPT | Performed by: FAMILY MEDICINE

## 2020-11-16 PROCEDURE — G0439 PPPS, SUBSEQ VISIT: HCPCS | Performed by: FAMILY MEDICINE

## 2020-11-16 PROCEDURE — 3008F BODY MASS INDEX DOCD: CPT | Performed by: FAMILY MEDICINE

## 2020-11-16 RX ORDER — LEVOTHYROXINE SODIUM 88 UG/1
TABLET ORAL
Qty: 90 TABLET | Refills: 0 | OUTPATIENT
Start: 2020-11-16

## 2020-11-16 RX ORDER — LEVOTHYROXINE SODIUM 88 UG/1
TABLET ORAL
Qty: 30 TABLET | Refills: 0 | OUTPATIENT
Start: 2020-11-16

## 2020-11-16 NOTE — PROGRESS NOTES
HPI:   Almaz Quinteros is a 72year old female who presents for a MA (Medicare Advantage) Supervisit (Once per calendar year).       Her last annual assessment has been over 1 year: Annual Physical due on 09/12/2020         Imms- due for Tdap, flu shot, cinda file in [place].    Advance care planning including the explanation and discussion of advance directives standard forms performed Face to Face with patient and Family/surrogate (if present), and forms available to patient in AVS       She has never smoked toba . Her family history includes Heart Disease in her father; Heart Disorder in her mother; High Blood Pressure in her brother and mother; High Cholesterol in her mother. SOCIAL HISTORY:   She  reports that she has never smoked.  She has never us CHRONIC CONDITIONS:   Lilliana Farley is a 72year old female who presents for a Medicare Assessment.      PLAN SUMMARY:   Diagnoses and all orders for this visit:    Encounter for annual health examination    Encounter for screening mammogram for [facility] Glucose (mg/dL)   Date Value   09/12/2019 91     GLUCOSE (mg/dL)   Date Value   07/20/2017 99          Cardiovascular Disease Screening     LDL Annually LDL Cholesterol (mg/dL)   Date Value   09/12/2019 175 (H)     LDL-CHOLESTEROL (mg/dL (calc))   Date Vicki VIII or IX concentrates   Clients of institutions for the mentally retarded   Persons who live in the same house as a HepB virus carrier   Homosexual men   Illicit injectable drug abusers     Tetanus Toxoid  Only covered with a cut with metal- TD and TDaP

## 2020-11-16 NOTE — TELEPHONE ENCOUNTER
Patient seen in the office today for MA Supervisit. She is fasting and will get labs done today. Refill was already sent on 11/13/20 for # 30 with 0 refills.

## 2020-11-16 NOTE — PATIENT INSTRUCTIONS
Kishor Zaman's SCREENING SCHEDULE   Tests on this list are recommended by your physician but may not be covered, or covered at this frequency, by your insurer. Please check with your insurance carrier before scheduling to verify coverage.    VANESA criteria:   • Men who are 73-68 years old and have smoked more than 100 cigarettes in their lifetime   • Anyone with a family history    Colorectal Cancer Screening  Covered up to Age 76     Colonoscopy Screen   Covered every 10 years- more often if abnorm 09/12/19   • FLU VACC HIGH DOSE PRSV FREE    Please get every year    Pneumococcal 13 (Prevnar)  Covered Once after 65 No orders found for this or any previous visit.  Please get once after your 65th birthday    Pneumococcal 23 (Pneumovax)  Covered Once aft

## 2020-11-18 ENCOUNTER — TELEPHONE (OUTPATIENT)
Dept: FAMILY MEDICINE CLINIC | Facility: CLINIC | Age: 70
End: 2020-11-18

## 2020-11-18 NOTE — TELEPHONE ENCOUNTER
Please inform Mandi Mcfarlane I received results from her last c-scope and ws done on 12/7/09. So she is already due. I gave her a referral for Dr. Roel Snow at her appt, double check she still has it and encourage her to call. Thanks.

## 2020-11-19 NOTE — TELEPHONE ENCOUNTER
Informed pt that her last coloscopy 12/7/2019, pt laughed and said Viktoriya Wheeler was a long time ago. \" Asked pt if she had the contact information for Dr. Lev Valderrama, pt confirmed that she did.   Pt states she will call and schedule an appointment but pt states she

## 2020-12-09 DIAGNOSIS — E03.9 ACQUIRED HYPOTHYROIDISM: ICD-10-CM

## 2020-12-09 DIAGNOSIS — Z76.0 MEDICATION REFILL: ICD-10-CM

## 2020-12-09 RX ORDER — LEVOTHYROXINE SODIUM 88 UG/1
TABLET ORAL
Qty: 90 TABLET | Refills: 0 | Status: SHIPPED | OUTPATIENT
Start: 2020-12-09 | End: 2021-03-11

## 2020-12-09 NOTE — TELEPHONE ENCOUNTER
Medication protocol passed:      Name from pharmacy: LEVOTHYROXINE 0.088MG (88MCG) TAB          Will file in chart as: LEVOTHYROXINE SODIUM 88 MCG Oral Tab    Sig: TAKE 1 TABLET(88 MCG) BY MOUTH EVERY MORNING BEFORE BREAKFAST    Disp:  90 tablet    Refills

## 2021-03-11 DIAGNOSIS — Z76.0 MEDICATION REFILL: ICD-10-CM

## 2021-03-11 DIAGNOSIS — E03.9 ACQUIRED HYPOTHYROIDISM: ICD-10-CM

## 2021-03-11 RX ORDER — LEVOTHYROXINE SODIUM 88 UG/1
TABLET ORAL
Qty: 90 TABLET | Refills: 2 | Status: SHIPPED | OUTPATIENT
Start: 2021-03-11 | End: 2021-12-03

## 2021-03-11 NOTE — TELEPHONE ENCOUNTER
Gema Mcgee  LEVOTHYROXINE 0.088MG (88MCG) TAB          Will file in chart as: LEVOTHYROXINE SODIUM 88 MCG Oral Tab    Sig: TAKE 1 TABLET(88 MCG) BY MOUTH EVERY MORNING BEFORE BREAKFAST    Disp:  90 tablet    Refills:  0 (Pharmacy requested: Not specified)    Start:

## 2021-03-17 ENCOUNTER — IMMUNIZATION (OUTPATIENT)
Dept: LAB | Age: 71
End: 2021-03-17
Attending: HOSPITALIST
Payer: MEDICARE

## 2021-03-17 DIAGNOSIS — Z23 NEED FOR VACCINATION: Primary | ICD-10-CM

## 2021-03-17 PROCEDURE — 0001A SARSCOV2 VAC 30MCG/0.3ML IM: CPT

## 2021-04-07 ENCOUNTER — IMMUNIZATION (OUTPATIENT)
Dept: LAB | Age: 71
End: 2021-04-07
Attending: HOSPITALIST
Payer: MEDICARE

## 2021-04-07 DIAGNOSIS — Z23 NEED FOR VACCINATION: Primary | ICD-10-CM

## 2021-04-07 PROCEDURE — 0002A SARSCOV2 VAC 30MCG/0.3ML IM: CPT

## 2021-11-06 ENCOUNTER — HOSPITAL ENCOUNTER (OUTPATIENT)
Dept: MAMMOGRAPHY | Age: 71
Discharge: HOME | End: 2021-11-06
Attending: FAMILY MEDICINE
Payer: MEDICARE

## 2021-11-06 DIAGNOSIS — Z12.31 ENCOUNTER FOR SCREENING MAMMOGRAM FOR MALIGNANT NEOPLASM OF BREAST: ICD-10-CM

## 2021-11-06 PROCEDURE — 77067 SCR MAMMO BI INCL CAD: CPT | Performed by: FAMILY MEDICINE

## 2021-11-06 PROCEDURE — 77063 BREAST TOMOSYNTHESIS BI: CPT | Performed by: FAMILY MEDICINE

## 2021-12-03 ENCOUNTER — OFFICE VISIT (OUTPATIENT)
Dept: FAMILY MEDICINE CLINIC | Facility: CLINIC | Age: 71
End: 2021-12-03
Payer: MEDICARE

## 2021-12-03 VITALS
WEIGHT: 151 LBS | TEMPERATURE: 98 F | DIASTOLIC BLOOD PRESSURE: 74 MMHG | OXYGEN SATURATION: 99 % | SYSTOLIC BLOOD PRESSURE: 128 MMHG | RESPIRATION RATE: 16 BRPM | BODY MASS INDEX: 27.79 KG/M2 | HEART RATE: 69 BPM | HEIGHT: 62 IN

## 2021-12-03 DIAGNOSIS — Z12.11 COLON CANCER SCREENING: ICD-10-CM

## 2021-12-03 DIAGNOSIS — Z00.00 ENCOUNTER FOR ANNUAL HEALTH EXAMINATION: Primary | ICD-10-CM

## 2021-12-03 DIAGNOSIS — Z78.0 ASYMPTOMATIC MENOPAUSAL STATE: ICD-10-CM

## 2021-12-03 DIAGNOSIS — Z00.00 LABORATORY EXAM ORDERED AS PART OF ROUTINE GENERAL MEDICAL EXAMINATION: ICD-10-CM

## 2021-12-03 DIAGNOSIS — E78.2 MIXED HYPERLIPIDEMIA: ICD-10-CM

## 2021-12-03 DIAGNOSIS — E03.9 ACQUIRED HYPOTHYROIDISM: ICD-10-CM

## 2021-12-03 PROCEDURE — 3008F BODY MASS INDEX DOCD: CPT | Performed by: FAMILY MEDICINE

## 2021-12-03 PROCEDURE — 3074F SYST BP LT 130 MM HG: CPT | Performed by: FAMILY MEDICINE

## 2021-12-03 PROCEDURE — 96160 PT-FOCUSED HLTH RISK ASSMT: CPT | Performed by: FAMILY MEDICINE

## 2021-12-03 PROCEDURE — 3078F DIAST BP <80 MM HG: CPT | Performed by: FAMILY MEDICINE

## 2021-12-03 PROCEDURE — 99397 PER PM REEVAL EST PAT 65+ YR: CPT | Performed by: FAMILY MEDICINE

## 2021-12-03 PROCEDURE — G0439 PPPS, SUBSEQ VISIT: HCPCS | Performed by: FAMILY MEDICINE

## 2021-12-03 RX ORDER — LEVOTHYROXINE SODIUM 88 UG/1
88 TABLET ORAL
Qty: 90 TABLET | Refills: 3 | Status: SHIPPED | OUTPATIENT
Start: 2021-12-03

## 2021-12-03 NOTE — PATIENT INSTRUCTIONS
Kishor Zaman's SCREENING SCHEDULE   Tests on this list are recommended by your physician but may not be covered, or covered at this frequency, by your insurer. Please check with your insurance carrier before scheduling to verify coverage.    PREVENT done   Pap and Pelvic    Pap   Covered every 2 years for women at normal risk;  Annually if at high risk -  No recommendations at this time    Chlamydia Annually if high risk -  No recommendations at this time   Screening Mammogram    Mammogram     Recommen

## 2021-12-03 NOTE — PROGRESS NOTES
HPI:   Karen Johnson is a 80year old female who presents for a MA (Medicare Advantage) Supervisit (Once per calendar year). Imms- up to date with both covid & Td. Will discuss covid booster, flu, both pneumonia shots, and both Shingrix vaccines.  Dis Living Will on file in [place].    Advance care planning including the explanation and discussion of advance directives standard forms performed Face to Face with patient and Family/surrogate (if present), and forms available to patient in AVS     She does NOT and colonoscopy (12/07/2009). Her family history includes Heart Disease in her father; Heart Disorder in her mother; High Blood Pressure in her brother and mother; High Cholesterol in her mother.    SOCIAL HISTORY:   She  reports that she has never smoke Administered   • Covid-19 Vaccine Pfizer 30 mcg/0.3 ml 03/17/2021, 04/07/2021   • TD 11/24/2009        ASSESSMENT AND OTHER RELEVANT CHRONIC CONDITIONS:   Lilliana Farley is a 80year old female who presents for a Medicare Assessment.      PLAN SUMMARY: Fasting Blood Sugar /  Glucose    One screening every 12 months if never tested or if previously tested but not diagnosed with pre-diabetes   One screening every 6 months if diagnosed with pre-diabetes Lab Results   Component Value Date    GLU 77 11/16/ for patients aged 35-39 11/06/2021    Mammogram due on 11/06/2022    Immunizations    Influenza Covered once per flu season  Please get every year -  Influenza Vaccine(1) Never done    Pneumococcal Each vaccine ([id number] & [id number]) covered once after

## 2022-12-08 ENCOUNTER — OFFICE VISIT (OUTPATIENT)
Dept: FAMILY MEDICINE CLINIC | Facility: CLINIC | Age: 72
End: 2022-12-08
Payer: MEDICARE

## 2022-12-08 VITALS
OXYGEN SATURATION: 100 % | HEIGHT: 62 IN | TEMPERATURE: 98 F | SYSTOLIC BLOOD PRESSURE: 122 MMHG | BODY MASS INDEX: 28.89 KG/M2 | WEIGHT: 157 LBS | HEART RATE: 74 BPM | RESPIRATION RATE: 16 BRPM | DIASTOLIC BLOOD PRESSURE: 62 MMHG

## 2022-12-08 DIAGNOSIS — Z00.00 ENCOUNTER FOR ANNUAL HEALTH EXAMINATION: Primary | ICD-10-CM

## 2022-12-08 DIAGNOSIS — E03.9 ACQUIRED HYPOTHYROIDISM: ICD-10-CM

## 2022-12-08 DIAGNOSIS — Z12.11 COLON CANCER SCREENING: ICD-10-CM

## 2022-12-08 DIAGNOSIS — I83.93 ASYMPTOMATIC VARICOSE VEINS OF BOTH LOWER EXTREMITIES: ICD-10-CM

## 2022-12-08 DIAGNOSIS — Z12.31 ENCOUNTER FOR SCREENING MAMMOGRAM FOR MALIGNANT NEOPLASM OF BREAST: ICD-10-CM

## 2022-12-08 DIAGNOSIS — Z78.0 ASYMPTOMATIC MENOPAUSAL STATE: ICD-10-CM

## 2022-12-08 DIAGNOSIS — Z28.21 INFLUENZA VACCINATION DECLINED BY PATIENT: ICD-10-CM

## 2022-12-08 DIAGNOSIS — Z00.00 LABORATORY EXAM ORDERED AS PART OF ROUTINE GENERAL MEDICAL EXAMINATION: ICD-10-CM

## 2022-12-08 DIAGNOSIS — E78.2 MIXED HYPERLIPIDEMIA: ICD-10-CM

## 2022-12-08 PROBLEM — R92.30 DENSE BREAST TISSUE ON MAMMOGRAM: Status: RESOLVED | Noted: 2017-12-21 | Resolved: 2022-12-08

## 2022-12-08 PROBLEM — R92.2 DENSE BREAST TISSUE ON MAMMOGRAM: Status: RESOLVED | Noted: 2017-12-21 | Resolved: 2022-12-08

## 2022-12-08 RX ORDER — LEVOTHYROXINE SODIUM 88 UG/1
88 TABLET ORAL
Qty: 90 TABLET | Refills: 3 | Status: SHIPPED | OUTPATIENT
Start: 2022-12-08

## 2022-12-09 LAB
ABSOLUTE BASOPHILS: 31 CELLS/UL (ref 0–200)
ABSOLUTE EOSINOPHILS: 151 CELLS/UL (ref 15–500)
ABSOLUTE LYMPHOCYTES: 1799 CELLS/UL (ref 850–3900)
ABSOLUTE MONOCYTES: 577 CELLS/UL (ref 200–950)
ABSOLUTE NEUTROPHILS: 2642 CELLS/UL (ref 1500–7800)
ALBUMIN/GLOBULIN RATIO: 1.9 (CALC) (ref 1–2.5)
ALBUMIN: 4.6 G/DL (ref 3.6–5.1)
ALKALINE PHOSPHATASE: 69 U/L (ref 37–153)
ALT: 17 U/L (ref 6–29)
AST: 18 U/L (ref 10–35)
BASOPHILS: 0.6 %
BILIRUBIN, TOTAL: 0.4 MG/DL (ref 0.2–1.2)
BUN: 14 MG/DL (ref 7–25)
CALCIUM: 9.8 MG/DL (ref 8.6–10.4)
CARBON DIOXIDE: 27 MMOL/L (ref 20–32)
CHLORIDE: 102 MMOL/L (ref 98–110)
CHOL/HDLC RATIO: 3.3 (CALC)
CHOLESTEROL, TOTAL: 292 MG/DL
CREATININE: 0.71 MG/DL (ref 0.6–1)
EGFR: 91 ML/MIN/1.73M2
EOSINOPHILS: 2.9 %
GLOBULIN: 2.4 G/DL (CALC) (ref 1.9–3.7)
GLUCOSE: 90 MG/DL (ref 65–99)
HDL CHOLESTEROL: 88 MG/DL
HEMATOCRIT: 40.8 % (ref 35–45)
HEMOGLOBIN: 13.5 G/DL (ref 11.7–15.5)
LDL-CHOLESTEROL: 176 MG/DL (CALC)
LYMPHOCYTES: 34.6 %
MCH: 30.4 PG (ref 27–33)
MCHC: 33.1 G/DL (ref 32–36)
MCV: 91.9 FL (ref 80–100)
MONOCYTES: 11.1 %
MPV: 10 FL (ref 7.5–12.5)
NEUTROPHILS: 50.8 %
NON-HDL CHOLESTEROL: 204 MG/DL (CALC)
PLATELET COUNT: 286 THOUSAND/UL (ref 140–400)
POTASSIUM: 4.5 MMOL/L (ref 3.5–5.3)
PROTEIN, TOTAL: 7 G/DL (ref 6.1–8.1)
RDW: 12 % (ref 11–15)
RED BLOOD CELL COUNT: 4.44 MILLION/UL (ref 3.8–5.1)
SODIUM: 137 MMOL/L (ref 135–146)
TRIGLYCERIDES: 139 MG/DL
TSH W/REFLEX TO FT4: 1.81 MIU/L (ref 0.4–4.5)
WHITE BLOOD CELL COUNT: 5.2 THOUSAND/UL (ref 3.8–10.8)

## 2023-07-22 ENCOUNTER — HOSPITAL ENCOUNTER (OUTPATIENT)
Dept: BONE DENSITY | Age: 73
Discharge: HOME | End: 2023-07-22
Attending: FAMILY MEDICINE
Payer: MEDICARE

## 2023-07-22 ENCOUNTER — HOSPITAL ENCOUNTER (OUTPATIENT)
Dept: MAMMOGRAPHY | Age: 73
Discharge: HOME | End: 2023-07-22
Attending: FAMILY MEDICINE
Payer: MEDICARE

## 2023-07-22 DIAGNOSIS — Z00.00 LABORATORY EXAM ORDERED AS PART OF ROUTINE GENERAL MEDICAL EXAMINATION: ICD-10-CM

## 2023-07-22 DIAGNOSIS — Z12.31 ENCOUNTER FOR SCREENING MAMMOGRAM FOR MALIGNANT NEOPLASM OF BREAST: ICD-10-CM

## 2023-07-22 DIAGNOSIS — Z78.0 ASYMPTOMATIC MENOPAUSAL STATE: ICD-10-CM

## 2023-07-22 PROCEDURE — 77063 BREAST TOMOSYNTHESIS BI: CPT | Performed by: FAMILY MEDICINE

## 2023-07-22 PROCEDURE — 77067 SCR MAMMO BI INCL CAD: CPT | Performed by: FAMILY MEDICINE

## 2023-07-22 PROCEDURE — 77080 DXA BONE DENSITY AXIAL: CPT | Performed by: FAMILY MEDICINE

## 2023-12-01 ENCOUNTER — TELEPHONE (OUTPATIENT)
Dept: FAMILY MEDICINE CLINIC | Facility: CLINIC | Age: 73
End: 2023-12-01

## 2023-12-01 DIAGNOSIS — Z00.00 LABORATORY EXAMINATION ORDERED AS PART OF A ROUTINE GENERAL MEDICAL EXAMINATION: Primary | ICD-10-CM

## 2023-12-01 DIAGNOSIS — E03.9 ACQUIRED HYPOTHYROIDISM: ICD-10-CM

## 2023-12-01 DIAGNOSIS — E78.2 MIXED HYPERLIPIDEMIA: ICD-10-CM

## 2023-12-01 RX ORDER — LEVOTHYROXINE SODIUM 88 UG/1
88 TABLET ORAL
Qty: 90 TABLET | Refills: 0 | Status: SHIPPED | OUTPATIENT
Start: 2023-12-01

## 2023-12-01 NOTE — TELEPHONE ENCOUNTER
Pt will need refill on the Levothyroxine 88 mcg prior to her appt. Please send to local [facility]. Pt would also like to know if she can have lab orders placed in the system to have done prior to her appt.   Future Appointments   Date Time Provider Zeke Mallory   12/21/2023 10:00 AM Lizz Lay MD [facility] [address] [address]

## 2023-12-04 ENCOUNTER — LAB ENCOUNTER (OUTPATIENT)
Dept: LAB | Age: 73
End: 2023-12-04
Attending: FAMILY MEDICINE
Payer: MEDICARE

## 2023-12-04 DIAGNOSIS — E78.2 MIXED HYPERLIPIDEMIA: ICD-10-CM

## 2023-12-04 DIAGNOSIS — E03.9 ACQUIRED HYPOTHYROIDISM: ICD-10-CM

## 2023-12-04 DIAGNOSIS — Z00.00 LABORATORY EXAMINATION ORDERED AS PART OF A ROUTINE GENERAL MEDICAL EXAMINATION: ICD-10-CM

## 2023-12-04 LAB
ALBUMIN SERPL-MCNC: 4 G/DL (ref 3.4–5)
ALBUMIN/GLOB SERPL: 1.3 {RATIO} (ref 1–2)
ALP LIVER SERPL-CCNC: 70 U/L
ALT SERPL-CCNC: 22 U/L
ANION GAP SERPL CALC-SCNC: 3 MMOL/L (ref 0–18)
AST SERPL-CCNC: 19 U/L (ref 15–37)
BASOPHILS # BLD AUTO: 0.02 X10(3) UL (ref 0–0.2)
BASOPHILS NFR BLD AUTO: 0.4 %
BILIRUB SERPL-MCNC: 0.5 MG/DL (ref 0.1–2)
BUN BLD-MCNC: 14 MG/DL (ref 9–23)
CALCIUM BLD-MCNC: 9.3 MG/DL (ref 8.5–10.1)
CHLORIDE SERPL-SCNC: 105 MMOL/L (ref 98–112)
CHOLEST SERPL-MCNC: 254 MG/DL (ref ?–200)
CO2 SERPL-SCNC: 30 MMOL/L (ref 21–32)
CREAT BLD-MCNC: 0.81 MG/DL
EGFRCR SERPLBLD CKD-EPI 2021: 77 ML/MIN/1.73M2 (ref 60–?)
EOSINOPHIL # BLD AUTO: 0.1 X10(3) UL (ref 0–0.7)
EOSINOPHIL NFR BLD AUTO: 2 %
ERYTHROCYTE [DISTWIDTH] IN BLOOD BY AUTOMATED COUNT: 11.7 %
FASTING PATIENT LIPID ANSWER: YES
FASTING STATUS PATIENT QL REPORTED: YES
GLOBULIN PLAS-MCNC: 3.2 G/DL (ref 2.8–4.4)
GLUCOSE BLD-MCNC: 93 MG/DL (ref 70–99)
HCT VFR BLD AUTO: 39.8 %
HDLC SERPL-MCNC: 70 MG/DL (ref 40–59)
HGB BLD-MCNC: 13.5 G/DL
IMM GRANULOCYTES # BLD AUTO: 0.02 X10(3) UL (ref 0–1)
IMM GRANULOCYTES NFR BLD: 0.4 %
LDLC SERPL CALC-MCNC: 149 MG/DL (ref ?–100)
LYMPHOCYTES # BLD AUTO: 1.55 X10(3) UL (ref 1–4)
LYMPHOCYTES NFR BLD AUTO: 31.1 %
MCH RBC QN AUTO: 30.8 PG (ref 26–34)
MCHC RBC AUTO-ENTMCNC: 33.9 G/DL (ref 31–37)
MCV RBC AUTO: 90.7 FL
MONOCYTES # BLD AUTO: 0.55 X10(3) UL (ref 0.1–1)
MONOCYTES NFR BLD AUTO: 11 %
NEUTROPHILS # BLD AUTO: 2.74 X10 (3) UL (ref 1.5–7.7)
NEUTROPHILS # BLD AUTO: 2.74 X10(3) UL (ref 1.5–7.7)
NEUTROPHILS NFR BLD AUTO: 55.1 %
NONHDLC SERPL-MCNC: 184 MG/DL (ref ?–130)
OSMOLALITY SERPL CALC.SUM OF ELEC: 286 MOSM/KG (ref 275–295)
PLATELET # BLD AUTO: 273 10(3)UL (ref 150–450)
POTASSIUM SERPL-SCNC: 3.8 MMOL/L (ref 3.5–5.1)
PROT SERPL-MCNC: 7.2 G/DL (ref 6.4–8.2)
RBC # BLD AUTO: 4.39 X10(6)UL
SODIUM SERPL-SCNC: 138 MMOL/L (ref 136–145)
TRIGL SERPL-MCNC: 199 MG/DL (ref 30–149)
TSI SER-ACNC: 0.81 MIU/ML (ref 0.36–3.74)
VLDLC SERPL CALC-MCNC: 38 MG/DL (ref 0–30)
WBC # BLD AUTO: 5 X10(3) UL (ref 4–11)

## 2023-12-04 PROCEDURE — 36415 COLL VENOUS BLD VENIPUNCTURE: CPT

## 2023-12-04 PROCEDURE — 80053 COMPREHEN METABOLIC PANEL: CPT

## 2023-12-04 PROCEDURE — 80061 LIPID PANEL: CPT

## 2023-12-04 PROCEDURE — 85025 COMPLETE CBC W/AUTO DIFF WBC: CPT

## 2023-12-04 PROCEDURE — 84443 ASSAY THYROID STIM HORMONE: CPT

## 2023-12-21 ENCOUNTER — OFFICE VISIT (OUTPATIENT)
Dept: FAMILY MEDICINE CLINIC | Facility: CLINIC | Age: 73
End: 2023-12-21
Payer: MEDICARE

## 2023-12-21 VITALS
RESPIRATION RATE: 16 BRPM | WEIGHT: 160 LBS | HEART RATE: 89 BPM | HEIGHT: 62 IN | TEMPERATURE: 98 F | DIASTOLIC BLOOD PRESSURE: 70 MMHG | OXYGEN SATURATION: 99 % | SYSTOLIC BLOOD PRESSURE: 130 MMHG | BODY MASS INDEX: 29.44 KG/M2

## 2023-12-21 DIAGNOSIS — Z12.31 ENCOUNTER FOR SCREENING MAMMOGRAM FOR MALIGNANT NEOPLASM OF BREAST: ICD-10-CM

## 2023-12-21 DIAGNOSIS — M85.80 OSTEOPENIA, UNSPECIFIED LOCATION: ICD-10-CM

## 2023-12-21 DIAGNOSIS — I83.93 ASYMPTOMATIC VARICOSE VEINS OF BOTH LOWER EXTREMITIES: ICD-10-CM

## 2023-12-21 DIAGNOSIS — E78.2 MIXED HYPERLIPIDEMIA: ICD-10-CM

## 2023-12-21 DIAGNOSIS — Z12.11 COLON CANCER SCREENING: ICD-10-CM

## 2023-12-21 DIAGNOSIS — E03.9 ACQUIRED HYPOTHYROIDISM: ICD-10-CM

## 2023-12-21 DIAGNOSIS — Z00.00 ENCOUNTER FOR ANNUAL HEALTH EXAMINATION: Primary | ICD-10-CM

## 2023-12-21 PROCEDURE — 3075F SYST BP GE 130 - 139MM HG: CPT | Performed by: FAMILY MEDICINE

## 2023-12-21 PROCEDURE — 1170F FXNL STATUS ASSESSED: CPT | Performed by: FAMILY MEDICINE

## 2023-12-21 PROCEDURE — 96160 PT-FOCUSED HLTH RISK ASSMT: CPT | Performed by: FAMILY MEDICINE

## 2023-12-21 PROCEDURE — G0439 PPPS, SUBSEQ VISIT: HCPCS | Performed by: FAMILY MEDICINE

## 2023-12-21 PROCEDURE — 1159F MED LIST DOCD IN RCRD: CPT | Performed by: FAMILY MEDICINE

## 2023-12-21 PROCEDURE — 3008F BODY MASS INDEX DOCD: CPT | Performed by: FAMILY MEDICINE

## 2023-12-21 PROCEDURE — 3078F DIAST BP <80 MM HG: CPT | Performed by: FAMILY MEDICINE

## 2023-12-21 PROCEDURE — 1126F AMNT PAIN NOTED NONE PRSNT: CPT | Performed by: FAMILY MEDICINE

## 2023-12-21 RX ORDER — LEVOTHYROXINE SODIUM 88 UG/1
88 TABLET ORAL
Qty: 90 TABLET | Refills: 3 | Status: SHIPPED | OUTPATIENT
Start: 2023-12-21

## 2024-03-01 DIAGNOSIS — E03.9 ACQUIRED HYPOTHYROIDISM: ICD-10-CM

## 2024-03-01 RX ORDER — LEVOTHYROXINE SODIUM 88 UG/1
88 TABLET ORAL
Qty: 90 TABLET | Refills: 2 | Status: SHIPPED | OUTPATIENT
Start: 2024-03-01

## 2024-03-01 NOTE — TELEPHONE ENCOUNTER
LEVOTHYROXINE 0.088MG (88MCG) TAB          Will file in chart as: LEVOTHYROXINE 88 MCG Oral Tab    Sig: TAKE 1 TABLET(88 MCG) BY MOUTH BEFORE BREAKFAST    Disp: 90 tablet    Refills: 3 (Pharmacy requested: Not specified)    Start: 3/1/2024    Class: Normal    Non-formulary For: Acquired hypothyroidism    Last ordered: 2 months ago (12/21/2023) by Sharon Dueñas MD    Last refill: 12/1/2023    Rx #: [id number]    Thyroid Medication Protocol [id number]/01/2024 09:37 AM   Protocol Details TSH in past 12 months    Last TSH value is normal    In person appointment or virtual visit in the past 12 mos or appointment in next 3 mos      To be filled at: [id number] [place], [place] - [address] [place] [facility] [place] [address], [phone], [phone]     Lab Results   Component Value Date    T4F 1.2 11/16/2020    TSH 0.808 12/04/2023    TSHT4 1.81 12/08/2022         LOV: 12/21/23 for wellness visit  RTC: 1 year  Last Relevant Labs: 12/4/23  Filled: 12/21/23 #90 with 3 refills    No future appointments.    Refill request approved per protocol.

## 2024-09-04 ENCOUNTER — HOSPITAL ENCOUNTER (OUTPATIENT)
Dept: MAMMOGRAPHY | Age: 74
Discharge: HOME | End: 2024-09-04
Attending: FAMILY MEDICINE
Payer: MEDICARE

## 2024-09-04 DIAGNOSIS — Z12.31 ENCOUNTER FOR SCREENING MAMMOGRAM FOR MALIGNANT NEOPLASM OF BREAST: ICD-10-CM

## 2024-09-04 PROCEDURE — 77067 SCR MAMMO BI INCL CAD: CPT | Performed by: FAMILY MEDICINE

## 2024-09-04 PROCEDURE — 77063 BREAST TOMOSYNTHESIS BI: CPT | Performed by: FAMILY MEDICINE

## 2025-03-20 ENCOUNTER — LAB ENCOUNTER (OUTPATIENT)
Dept: LAB | Age: 75
End: 2025-03-20
Attending: FAMILY MEDICINE
Payer: MEDICARE

## 2025-03-20 ENCOUNTER — OFFICE VISIT (OUTPATIENT)
Dept: FAMILY MEDICINE CLINIC | Facility: CLINIC | Age: 75
End: 2025-03-20
Payer: MEDICARE

## 2025-03-20 VITALS
SYSTOLIC BLOOD PRESSURE: 120 MMHG | WEIGHT: 153 LBS | RESPIRATION RATE: 16 BRPM | DIASTOLIC BLOOD PRESSURE: 70 MMHG | TEMPERATURE: 98 F | BODY MASS INDEX: 28.16 KG/M2 | OXYGEN SATURATION: 99 % | HEART RATE: 72 BPM | HEIGHT: 62 IN

## 2025-03-20 DIAGNOSIS — M85.80 OSTEOPENIA, UNSPECIFIED LOCATION: ICD-10-CM

## 2025-03-20 DIAGNOSIS — Z28.21 INFLUENZA VACCINATION DECLINED BY PATIENT: ICD-10-CM

## 2025-03-20 DIAGNOSIS — Z86.0100 HISTORY OF COLON POLYPS: ICD-10-CM

## 2025-03-20 DIAGNOSIS — E78.2 MIXED HYPERLIPIDEMIA: ICD-10-CM

## 2025-03-20 DIAGNOSIS — Z00.00 ENCOUNTER FOR ANNUAL HEALTH EXAMINATION: Primary | ICD-10-CM

## 2025-03-20 DIAGNOSIS — R06.02 SOB (SHORTNESS OF BREATH) ON EXERTION: ICD-10-CM

## 2025-03-20 DIAGNOSIS — E03.9 ACQUIRED HYPOTHYROIDISM: ICD-10-CM

## 2025-03-20 DIAGNOSIS — Z12.31 ENCOUNTER FOR SCREENING MAMMOGRAM FOR MALIGNANT NEOPLASM OF BREAST: ICD-10-CM

## 2025-03-20 DIAGNOSIS — Z78.0 ASYMPTOMATIC MENOPAUSAL STATE: ICD-10-CM

## 2025-03-20 DIAGNOSIS — Z00.00 ENCOUNTER FOR ANNUAL HEALTH EXAMINATION: ICD-10-CM

## 2025-03-20 LAB
ALBUMIN SERPL-MCNC: 4.7 G/DL (ref 3.2–4.8)
ALBUMIN/GLOB SERPL: 1.9 {RATIO} (ref 1–2)
ALP LIVER SERPL-CCNC: 74 U/L
ALT SERPL-CCNC: 15 U/L
ANION GAP SERPL CALC-SCNC: 6 MMOL/L (ref 0–18)
AST SERPL-CCNC: 21 U/L (ref ?–34)
BASOPHILS # BLD AUTO: 0.02 X10(3) UL (ref 0–0.2)
BASOPHILS NFR BLD AUTO: 0.3 %
BILIRUB SERPL-MCNC: 0.8 MG/DL (ref 0.2–1.1)
BUN BLD-MCNC: 8 MG/DL (ref 9–23)
CALCIUM BLD-MCNC: 9.5 MG/DL (ref 8.7–10.6)
CHLORIDE SERPL-SCNC: 100 MMOL/L (ref 98–112)
CHOLEST SERPL-MCNC: 228 MG/DL (ref ?–200)
CO2 SERPL-SCNC: 29 MMOL/L (ref 21–32)
CREAT BLD-MCNC: 0.83 MG/DL
EGFRCR SERPLBLD CKD-EPI 2021: 74 ML/MIN/1.73M2 (ref 60–?)
EOSINOPHIL # BLD AUTO: 0.18 X10(3) UL (ref 0–0.7)
EOSINOPHIL NFR BLD AUTO: 2.7 %
ERYTHROCYTE [DISTWIDTH] IN BLOOD BY AUTOMATED COUNT: 12.1 %
FASTING PATIENT LIPID ANSWER: YES
FASTING STATUS PATIENT QL REPORTED: YES
GLOBULIN PLAS-MCNC: 2.5 G/DL (ref 2–3.5)
GLUCOSE BLD-MCNC: 98 MG/DL (ref 70–99)
HCT VFR BLD AUTO: 37.3 %
HDLC SERPL-MCNC: 72 MG/DL (ref 40–59)
HGB BLD-MCNC: 12.9 G/DL
IMM GRANULOCYTES # BLD AUTO: 0.02 X10(3) UL (ref 0–1)
IMM GRANULOCYTES NFR BLD: 0.3 %
LDLC SERPL CALC-MCNC: 138 MG/DL (ref ?–100)
LYMPHOCYTES # BLD AUTO: 1.67 X10(3) UL (ref 1–4)
LYMPHOCYTES NFR BLD AUTO: 25 %
MCH RBC QN AUTO: 31.2 PG (ref 26–34)
MCHC RBC AUTO-ENTMCNC: 34.6 G/DL (ref 31–37)
MCV RBC AUTO: 90.1 FL
MONOCYTES # BLD AUTO: 0.78 X10(3) UL (ref 0.1–1)
MONOCYTES NFR BLD AUTO: 11.7 %
NEUTROPHILS # BLD AUTO: 4.01 X10 (3) UL (ref 1.5–7.7)
NEUTROPHILS # BLD AUTO: 4.01 X10(3) UL (ref 1.5–7.7)
NEUTROPHILS NFR BLD AUTO: 60 %
NONHDLC SERPL-MCNC: 156 MG/DL (ref ?–130)
NT-PROBNP SERPL-MCNC: 167 PG/ML (ref ?–125)
OSMOLALITY SERPL CALC.SUM OF ELEC: 278 MOSM/KG (ref 275–295)
PLATELET # BLD AUTO: 267 10(3)UL (ref 150–450)
POTASSIUM SERPL-SCNC: 4.3 MMOL/L (ref 3.5–5.1)
PROT SERPL-MCNC: 7.2 G/DL (ref 5.7–8.2)
RBC # BLD AUTO: 4.14 X10(6)UL
SODIUM SERPL-SCNC: 135 MMOL/L (ref 136–145)
TRIGL SERPL-MCNC: 105 MG/DL (ref 30–149)
TSI SER-ACNC: 0.17 UIU/ML (ref 0.55–4.78)
VLDLC SERPL CALC-MCNC: 19 MG/DL (ref 0–30)
WBC # BLD AUTO: 6.7 X10(3) UL (ref 4–11)

## 2025-03-20 PROCEDURE — 85025 COMPLETE CBC W/AUTO DIFF WBC: CPT

## 2025-03-20 PROCEDURE — 80061 LIPID PANEL: CPT

## 2025-03-20 PROCEDURE — 84443 ASSAY THYROID STIM HORMONE: CPT

## 2025-03-20 PROCEDURE — 83880 ASSAY OF NATRIURETIC PEPTIDE: CPT

## 2025-03-20 PROCEDURE — 80053 COMPREHEN METABOLIC PANEL: CPT

## 2025-03-20 PROCEDURE — 36415 COLL VENOUS BLD VENIPUNCTURE: CPT

## 2025-03-20 RX ORDER — LEVOTHYROXINE SODIUM 88 UG/1
88 TABLET ORAL
Qty: 90 TABLET | Refills: 3 | Status: SHIPPED | OUTPATIENT
Start: 2025-03-20

## 2025-03-20 NOTE — PROGRESS NOTES
Subjective:   Kishor Zaman is a 74 year old female who presents for a MA AHA (Medicare Advantage Annual Health Assessment) and Medicare Subsequent Annual Wellness visit (Pt already had Initial Annual Wellness) and acute uncomplicated problem.   History of Present Illness      Imms- Discussed covid, flu, RSV, both pneumonia shots, and Shingrix. Declines varicella titer as well. Brothers all had chicken pox.        Cervical cancer screening- No h/o abnormal paps, HPV, or genital warts. No further paps needed.        Breast cancer screening- No known family history of breast/ovarian cancer. Mamm normal 24.        Colon cancer screening- No family h/o colon cancer. C-scope 09 with hyperplastic polyp, but the c-scope report said to repeat in 5 yrs. [facility]. Overdue, 3  GI referrals.        Osteoporosis screening- osteopenia on DEXA done 2023.  Repeat 2 years. Walks 2mi every day.   Non-smoker  No ETOH in excess     Hepatitis C screening- non-reactive on 20.     Hearing- doing well     POA/LW- not on file, has it but needs to update it since her  past~. Hard to remove his name.     Diet/exercise- working on this.     Dental/Eye Check up-  Recommended pt see dentist once every 6 months for a cleaning and once every year for an eye exam.        Hypothyroidism- on levothyroxine, due for recheck     Hyperlipidemia-  on 23 Taking flaxseed only.     Varicose veins, B legs- no pain fortunately.     B neg blood- donates regularly.     SOB with walking- walks 2mi daily on her own is fine, but started last year when she walks with her neighbor who walks faster and that is the only time she is SOB. Non-smoker.  was a 1pk per 3days smoking.   Mother had heart disease    Denies- leg swelling, orthopnea, paroxysymal, chest pain, palpitations, dizzy, syncope, n/v, L jaw/neck/arm pain.          History/Other:   Fall Risk Assessment:   She has been screened for Falls and is  low risk.      Cognitive Assessment:   She had a completely normal cognitive assessment - see flowsheet entries     Functional Ability/Status:   Kishor Zaman has a completely normal functional assessment. See flowsheet for details.      Depression Screening (PHQ):  PHQ-2 SCORE: 0  , done 3/20/2025   Last [place] Suicide Screening on 3/20/2025 was No Risk.          Advanced Directives:   She does NOT have a Living Will. [Do you have a living will?: (Patient-Rptd) No]  She does NOT have a Power of  for Health Care. [Do you have a healthcare power of ?: (Patient-Rptd) No]  Patient has Advance Care Planning documents but we do not have a copy in EMR. Discussed Advanced Care Planning with patient and instructed patient to get our office a copy to be scanned into EMR.      Patient Active Problem List   Diagnosis    Acquired hypothyroidism    Mixed hyperlipidemia    Asymptomatic varicose veins of both lower extremities    Osteopenia     Allergies:  She has No Known Allergies.    Current Medications:  Outpatient Medications Marked as Taking for the 3/20/25 encounter (Office Visit) with Sharon Dueñas MD   Medication Sig    levothyroxine 88 MCG Oral Tab Take 1 tablet (88 mcg total) by mouth before breakfast.    Multiple Vitamin (MULTI-VITAMIN DAILY OR) Take 1 tablet by mouth daily.      Flaxseed, Linseed, (FLAX SEED OIL OR) Take 1 tablet by mouth daily.         Medical History:  She  has a past medical history of Acquired hypothyroidism, Colon polyps (2009), Dense breasts, Dyslipidemia, Osteopenia (2009), and Varicose veins.  Surgical History:  She  has a past surgical history that includes  and colonoscopy (2009).   Family History:  Her family history includes Heart Disease in her father; Heart Disorder in her mother; High Blood Pressure in her brother and mother; High Cholesterol in her mother.  Social History:  She  reports that she has never smoked. She has never used  smokeless tobacco. She reports current alcohol use of about 1.0 standard drink of alcohol per week. She reports that she does not use drugs.    Tobacco:  She has never smoked tobacco.    CAGE Alcohol Screen:   CAGE screening score of 0 on 3/19/2025, showing low risk of alcohol abuse.      Patient Care Team:  Sharon Dueñas MD as PCP - General    Review of Systems     Negative except sob on exertion    Objective:   Physical Exam       /70   Pulse 72   Temp 97.9 °F (36.6 °C) (Temporal)   Resp 16   Ht 5' 2\" (1.575 m)   Wt 153 lb (69.4 kg)   SpO2 99%   BMI 27.98 kg/m²  Estimated body mass index is 27.98 kg/m² as calculated from the following:    Height as of this encounter: 5' 2\" (1.575 m).    Weight as of this encounter: 153 lb (69.4 kg).    GENERAL APPEARANCE:  Alert, no acute distress, appears stated age  HEENT:  Head- Normocephalic, atraumatic.    Eyes- Extraocular movements intact, pupils equally round and reactive to light    and accommodation, conjunctivae normal.    Ears- Tympanic membranes intact bilaterally.    Nose- Patent, normal septum and turbinates.    Mouth/Throat- Normal oral mucosa, throat non-erythematous.  NECK:  No submental, submandibular, ant/post cervical lymphadenopathy. No thyromegaly or masses.  PULMONARY:  Lungs clear to auscultation bilaterally. No wheezes, rales, or rhonchi.  CARDIOVASCULAR:  Regular rate and rhythm. No murmurs, gallops, or rubs.  ABDOMEN:  + bowel sounds, soft, nontender, nondistended. No hepatomegaly.  MUSCULOSKELETAL: Strength of upper and lower extremities 5/5 bilaterally. Normal gait.  NEUROLOGIC:  Cranial nerves 2-12 grossly intact.  PSYCHIATRIC:  Normal mood, affect, and hygiene.  Breasts: breast appear normal, no suspicious masses, no skin or nipple changes/discharge. No supraclavicular or axillary nodes. Declined chaperone    Medicare Hearing Assessment:   Hearing Screening    Time taken: 3/20/2025  9:43 AM  Entry User: Sharon Dueñas  MD  Screening Method: Finger Rub  Finger Rub Result: Pass         Visual Acuity:   Right Eye Visual Acuity: Uncorrected Right Eye Chart Acuity: 20/15   Left Eye Visual Acuity: Uncorrected Left Eye Chart Acuity: 20/13   Both Eyes Visual Acuity: Uncorrected Both Eyes Chart Acuity: 20/15   Able To Tolerate Visual Acuity: Yes        Assessment & Plan:   Kishor Zaman is a 74 year old female who presents for a Medicare Assessment.     1. Encounter for annual health examination (Primary)  -     CBC With Differential With Platelet; Future; Expected date: 03/20/2025  2. Encounter for screening mammogram for malignant neoplasm of breast  -     [facility] BIRDIE 2D+3D SCREENING BILAT (CPT=77067/85010); Future; Expected date: 09/05/2025  3. History of colon polyps  -     Gastro Referral - In Network  4. Osteopenia, unspecified location  -     XR DEXA BONE DENSITOMETRY (CPT=77080); Future; Expected date: 07/23/2025  -     Assay, Thyroid Stim Hormone; Future; Expected date: 03/20/2025  5. Asymptomatic menopausal state  -     XR DEXA BONE DENSITOMETRY (CPT=77080); Future; Expected date: 07/23/2025  6. Influenza vaccination declined by patient  -     High Dose Fluzone trivalent influenza, 65 yrs+ PFS [10963]  7. Acquired hypothyroidism  -     Assay, Thyroid Stim Hormone; Future; Expected date: 03/20/2025  -     CBC With Differential With Platelet; Future; Expected date: 03/20/2025  -     Levothyroxine Sodium; Take 1 tablet (88 mcg total) by mouth before breakfast.  Dispense: 90 tablet; Refill: 3  8. Mixed hyperlipidemia  -     CBC With Differential With Platelet; Future; Expected date: 03/20/2025  -     Comp Metabolic Panel (14); Future; Expected date: 03/20/2025  -     Lipid Panel; Future; Expected date: 03/20/2025  9. SOB (shortness of breath) on exertion  -     Pro Beta Natriuretic Peptide; Future; Expected date: 03/20/2025  -     EKG 12 Lead; Future; Expected date: 03/20/2025    Assessment & Plan    The patient indicates  understanding of these issues and agrees to the plan.  Reinforced healthy diet, lifestyle, and exercise.      Return in about 1 year (around 3/20/2026) for annual supervisit, we will contact you with results.     Sharon Dueñas MD, 3/20/2025     Supplementary Documentation:   General Health:  In the past six months, have you lost more than 10 pounds without trying?: (Patient-Rptd) 2 - No  Has your appetite been poor?: (Patient-Rptd) No  Type of Diet: (Patient-Rptd) Balanced  How does the patient maintain a good energy level?: (Patient-Rptd) Daily Walks  How would you describe your daily physical activity?: (Patient-Rptd) Moderate  How would you describe your current health state?: (Patient-Rptd) Good  How do you maintain positive mental well-being?: (Patient-Rptd) Social Interaction  On a scale of 0 to 10, with 0 being no pain and 10 being severe pain, what is your pain level?: (Patient-Rptd) 0 - (None)  In the past six months, have you experienced urine leakage?: (Patient-Rptd) 0-No  At any time do you feel concerned for the safety/well-being of yourself and/or your children, in your home or elsewhere?: (Patient-Rptd) No  Have you had any immunizations at another office such as Influenza, Hepatitis B, Tetanus, or Pneumococcal?: (Patient-Rptd) No    Health Maintenance   Topic Date Due    Pneumococcal Vaccine: 50+ Years (1 of 1 - PCV) Never done    Zoster Vaccines (1 of 2) Never done    Colorectal Cancer Screening  12/07/2014    COVID-19 Vaccine (3 - 2024-25 season) 09/01/2024    Influenza Vaccine (1) Never done    Annual Well Visit  01/01/2025    Mammogram  09/04/2025    DEXA Scan  Completed    Annual Depression Screening  Completed    Fall Risk Screening (Annual)  Completed    Meningococcal B Vaccine  Aged Out

## 2025-03-25 DIAGNOSIS — E03.9 HYPOTHYROIDISM, UNSPECIFIED TYPE: Primary | ICD-10-CM

## 2025-03-25 DIAGNOSIS — R06.02 SOB (SHORTNESS OF BREATH) ON EXERTION: ICD-10-CM

## 2025-03-25 DIAGNOSIS — R89.9 ABNORMAL LABORATORY TEST RESULT: ICD-10-CM

## 2025-03-25 RX ORDER — FUROSEMIDE 20 MG/1
20 TABLET ORAL EVERY MORNING
Qty: 30 TABLET | Refills: 0 | Status: SHIPPED | OUTPATIENT
Start: 2025-03-25

## 2025-03-25 RX ORDER — LEVOTHYROXINE SODIUM 75 UG/1
75 TABLET ORAL
Qty: 60 TABLET | Refills: 0 | Status: SHIPPED | OUTPATIENT
Start: 2025-03-25

## 2025-05-13 DIAGNOSIS — R89.9 ABNORMAL LABORATORY TEST RESULT: ICD-10-CM

## 2025-05-13 DIAGNOSIS — R06.02 SOB (SHORTNESS OF BREATH) ON EXERTION: ICD-10-CM

## 2025-05-13 RX ORDER — FUROSEMIDE 20 MG/1
20 TABLET ORAL EVERY MORNING
Qty: 30 TABLET | Refills: 0 | Status: SHIPPED | OUTPATIENT
Start: 2025-05-13

## 2025-05-13 NOTE — TELEPHONE ENCOUNTER
FUROSEMIDE 20MG TABLETS          Will file in chart as: FUROSEMIDE 20 MG Oral Tab    Sig: TAKE 1 TABLET(20 MG) BY MOUTH EVERY MORNING    Disp: 30 tablet    Refills: 0 (Pharmacy requested: Not specified)    Start: 5/13/2025    Class: Normal    Non-formulary For: Abnormal laboratory test result, SOB (shortness of breath) on exertion    Last ordered: 1 month ago (3/25/2025) by Sharon Dueñas MD    Last refill: 4/9/2025    Rx #: [id number]    Hypertension Medications Protocol [id number]/13/2025 10:36 AM   Protocol Details CMP or BMP in past 12 months    Last BP reading less than 140/90    In person appointment or virtual visit in the past 12 mos or appointment in next 3 mos    EGFRCR or GFRNAA > 50    Medication is active on med list      To be filled at: [id number] [place], [place] - [address] AT [facility] [address], [phone], [phone]     LOV: 3/20/25 for wellness visit  RTC: 1 year  Last Relevant Labs: 3/20/25  Filled: 3/25/25 #30 with 0 refills    No future appointments.      Refill pended & routed to PCP for review/approval?

## 2025-06-11 ENCOUNTER — TELEPHONE (OUTPATIENT)
Dept: FAMILY MEDICINE CLINIC | Facility: CLINIC | Age: 75
End: 2025-06-11

## 2025-06-11 DIAGNOSIS — R06.02 SOB (SHORTNESS OF BREATH) ON EXERTION: ICD-10-CM

## 2025-06-11 DIAGNOSIS — R89.9 ABNORMAL LABORATORY TEST RESULT: ICD-10-CM

## 2025-06-11 NOTE — TELEPHONE ENCOUNTER
I can send a refill but per Dr. Dueñas on 5/13/25, patient needs to schedule an appointment. He was informed of this but never scheduled.

## 2025-06-13 RX ORDER — FUROSEMIDE 20 MG/1
20 TABLET ORAL DAILY
Qty: 30 TABLET | Refills: 0 | Status: SHIPPED | OUTPATIENT
Start: 2025-06-13

## 2025-06-13 NOTE — TELEPHONE ENCOUNTER
Requesting Furosemide 20mg  LOV: 3/20/25 Physical  RTC: 1 year  Last Relevant Labs: 3/20/25  Filled: 5/13/25 #30 with 0 refills    Future Appointments   Date Time Provider Department Center   6/17/2025 11:00 AM Sharon Dueñas MD [facility] [address] [facility] [address]     Hypertension Medications Protocol [id number]/11/2025 05:15 PM   Protocol Details   Medication is active on med list    CMP or BMP in past 12 months    Last BP reading less than 140/90    In person appointment or virtual visit in the past 12 mos or appointment in next 3 mos    EGFRCR or GFRNAA > 50     Rx sent to pharmacy per protocol

## 2025-06-17 ENCOUNTER — LAB ENCOUNTER (OUTPATIENT)
Dept: LAB | Age: 75
End: 2025-06-17
Attending: FAMILY MEDICINE
Payer: MEDICARE

## 2025-06-17 ENCOUNTER — OFFICE VISIT (OUTPATIENT)
Dept: FAMILY MEDICINE CLINIC | Facility: CLINIC | Age: 75
End: 2025-06-17
Payer: MEDICARE

## 2025-06-17 ENCOUNTER — EKG ENCOUNTER (OUTPATIENT)
Dept: LAB | Age: 75
End: 2025-06-17
Attending: FAMILY MEDICINE
Payer: MEDICARE

## 2025-06-17 VITALS
DIASTOLIC BLOOD PRESSURE: 70 MMHG | SYSTOLIC BLOOD PRESSURE: 122 MMHG | OXYGEN SATURATION: 100 % | WEIGHT: 147 LBS | HEART RATE: 64 BPM | HEIGHT: 62 IN | RESPIRATION RATE: 16 BRPM | BODY MASS INDEX: 27.05 KG/M2 | TEMPERATURE: 98 F

## 2025-06-17 DIAGNOSIS — E03.9 ACQUIRED HYPOTHYROIDISM: ICD-10-CM

## 2025-06-17 DIAGNOSIS — R06.02 SOB (SHORTNESS OF BREATH) ON EXERTION: ICD-10-CM

## 2025-06-17 DIAGNOSIS — E78.2 MIXED HYPERLIPIDEMIA: ICD-10-CM

## 2025-06-17 DIAGNOSIS — E03.9 ACQUIRED HYPOTHYROIDISM: Primary | ICD-10-CM

## 2025-06-17 DIAGNOSIS — E03.9 HYPOTHYROIDISM, UNSPECIFIED TYPE: ICD-10-CM

## 2025-06-17 LAB
NT-PROBNP SERPL-MCNC: 170 PG/ML (ref ?–125)
T4 FREE SERPL-MCNC: 1.2 NG/DL (ref 0.8–1.7)
TSI SER-ACNC: 1.62 UIU/ML (ref 0.55–4.78)

## 2025-06-17 PROCEDURE — 36415 COLL VENOUS BLD VENIPUNCTURE: CPT

## 2025-06-17 PROCEDURE — 3074F SYST BP LT 130 MM HG: CPT | Performed by: FAMILY MEDICINE

## 2025-06-17 PROCEDURE — G2211 COMPLEX E/M VISIT ADD ON: HCPCS | Performed by: FAMILY MEDICINE

## 2025-06-17 PROCEDURE — 83880 ASSAY OF NATRIURETIC PEPTIDE: CPT

## 2025-06-17 PROCEDURE — 99214 OFFICE O/P EST MOD 30 MIN: CPT | Performed by: FAMILY MEDICINE

## 2025-06-17 PROCEDURE — 93005 ELECTROCARDIOGRAM TRACING: CPT

## 2025-06-17 PROCEDURE — 93010 ELECTROCARDIOGRAM REPORT: CPT | Performed by: INTERNAL MEDICINE

## 2025-06-17 PROCEDURE — 3078F DIAST BP <80 MM HG: CPT | Performed by: FAMILY MEDICINE

## 2025-06-17 PROCEDURE — 3008F BODY MASS INDEX DOCD: CPT | Performed by: FAMILY MEDICINE

## 2025-06-17 PROCEDURE — 84439 ASSAY OF FREE THYROXINE: CPT

## 2025-06-17 PROCEDURE — 1159F MED LIST DOCD IN RCRD: CPT | Performed by: FAMILY MEDICINE

## 2025-06-17 PROCEDURE — 84443 ASSAY THYROID STIM HORMONE: CPT

## 2025-06-17 RX ORDER — LEVOTHYROXINE SODIUM 75 UG/1
75 TABLET ORAL
Qty: 30 TABLET | Refills: 0 | Status: SHIPPED | OUTPATIENT
Start: 2025-06-17 | End: 2025-06-18

## 2025-06-17 NOTE — PROGRESS NOTES
[facility] Visit Note  6/17/2025      Subjective:      Patient ID: Kishor Zaman is a 75 year old female.    Chief Complaint:  Chief Complaint   Patient presents with    Refill Request     States she's been out of the Levothyroxine 75 mcg for 3 days and is here for refill. Dose was adjusted & repeat testing was to be done 2 month later per the 3/20/25 lab results.       HPI:  Kishor Zaman is a 75 year old female who is being seen today for the above.       Hypothyroidism-  been out of the Levothyroxine 75 mcg for 3 days and is here for refill. Dose was adjusted from 88 down to 75mcg. Didn't get repeat labs done yet.       SOB on exertion- her screening test for CHF (, normal <125 on 3/20/25) was a little high,so she was started on furosemide 20mg 1# q AM, #30, no Rf. An echo and EKG were ordered, but she didn't get it done b/c her SOB was improved, likely b/c of the furosemide.    Had complained of SOB with walking with her neighbor starting 1 yr ago who walks faster than she. She is I ok on her 2mi walk daily on her own.   Non-smoker  smoked, so + passive exposure.  Mother had heart disease.    Denies- leg swelling, orthopnea, paroxysymal, chest pain, palpitations, dizzy, syncope, n/v, L jaw/neck/arm pain       HL-   with tot chol 228, HDL 72 on 3/20/25  Mother had heart disease.      Review of Systems - as stated above in the HPI      Objective:     Vitals:    06/17/25 1045   BP: 122/70   Pulse: 64   Resp: 16   Temp: 97.9 °F (36.6 °C)   TempSrc: Temporal   SpO2: 100%   Weight: 147 lb (66.7 kg)   Height: 5' 2\" (1.575 m)       Physical Examination   General:  Alert, in no acute distress  HEENT: NCAT, EOMI, mucus membranes moist   Neck:  No cervical lymphadenopathy  CV: Regular rate and rhythm. No murmurs, gallops, or rubs.  Lungs:  Clear to auscultation B, no wheezes, rales, or rhonchi, normal respiratory effort  Abd:  +bowel sounds, soft  Ext:  No pedal edema,  Pedal pulses 2+  B        Assessment:     1. Acquired hypothyroidism  - levothyroxine 75 MCG Oral Tab; Take 1 tablet (75 mcg total) by mouth before breakfast. Needs labs for further refills.  Dispense: 30 tablet; Refill: 0  - TSH and Free T4; Future    2. Mixed hyperlipidemia    3. Hypothyroidism, unspecified type  - levothyroxine 75 MCG Oral Tab; Take 1 tablet (75 mcg total) by mouth before breakfast. Needs labs for further refills.  Dispense: 30 tablet; Refill: 0    4. SOB (shortness of breath) on exertion  - Pro Beta Natriuretic Peptide [E]; Future  -discussed importance of knowing/confirming our diagnoses so we know what we are treating and rule out ischemia, etc. Suspect mild CHF.  -has a refill of furosemide, but would not refill further until echo/EKG done.      I am providing care as part of an ongoing, longitudinal care relationship.    Return in about 3 weeks (around 7/8/2025) for f/u echo and EKG.

## 2025-06-18 LAB
ATRIAL RATE: 62 BPM
P AXIS: 14 DEGREES
P-R INTERVAL: 136 MS
Q-T INTERVAL: 412 MS
QRS DURATION: 82 MS
QTC CALCULATION (BEZET): 418 MS
R AXIS: -2 DEGREES
T AXIS: 31 DEGREES
VENTRICULAR RATE: 62 BPM

## (undated) NOTE — LETTER
07/12/17        Kishor Zaman  [id number]      Dear Mercedes Bosch,    [address] [facility] records indicate that you have outstanding lab work and or testing that was ordered for you and has not yet been completed:          CBC [3604] [Q]      COM